# Patient Record
Sex: FEMALE | Race: WHITE | HISPANIC OR LATINO | Employment: FULL TIME | URBAN - METROPOLITAN AREA
[De-identification: names, ages, dates, MRNs, and addresses within clinical notes are randomized per-mention and may not be internally consistent; named-entity substitution may affect disease eponyms.]

---

## 2020-03-13 ENCOUNTER — HOSPITAL ENCOUNTER (EMERGENCY)
Facility: MEDICAL CENTER | Age: 35
End: 2020-03-13
Attending: EMERGENCY MEDICINE

## 2020-03-13 VITALS
OXYGEN SATURATION: 97 % | RESPIRATION RATE: 18 BRPM | TEMPERATURE: 98.1 F | HEART RATE: 89 BPM | SYSTOLIC BLOOD PRESSURE: 133 MMHG | DIASTOLIC BLOOD PRESSURE: 87 MMHG

## 2020-03-13 DIAGNOSIS — S51.812A LACERATION OF LEFT FOREARM, INITIAL ENCOUNTER: Primary | ICD-10-CM

## 2020-03-13 DIAGNOSIS — S66.922A FLEXOR TENDON LACERATION OF LEFT WRIST WITH OPEN WOUND, INITIAL ENCOUNTER: ICD-10-CM

## 2020-03-13 DIAGNOSIS — S61.502A FLEXOR TENDON LACERATION OF LEFT WRIST WITH OPEN WOUND, INITIAL ENCOUNTER: ICD-10-CM

## 2020-03-13 LAB
AMPHET UR QL SCN: NEGATIVE
BARBITURATES UR QL SCN: NEGATIVE
BENZODIAZ UR QL SCN: NEGATIVE
BZE UR QL SCN: NEGATIVE
CANNABINOIDS UR QL SCN: NEGATIVE
METHADONE UR QL SCN: NEGATIVE
OPIATES UR QL SCN: NEGATIVE
OXYCODONE UR QL SCN: NEGATIVE
PCP UR QL SCN: NEGATIVE
POC BREATHALIZER: 0 PERCENT (ref 0–0.01)
PROPOXYPH UR QL SCN: NEGATIVE

## 2020-03-13 PROCEDURE — 303485 HCHG DRESSING MEDIUM

## 2020-03-13 PROCEDURE — 304217 HCHG IRRIGATION SYSTEM

## 2020-03-13 PROCEDURE — 80307 DRUG TEST PRSMV CHEM ANLYZR: CPT

## 2020-03-13 PROCEDURE — 700102 HCHG RX REV CODE 250 W/ 637 OVERRIDE(OP): Performed by: EMERGENCY MEDICINE

## 2020-03-13 PROCEDURE — 700101 HCHG RX REV CODE 250: Performed by: EMERGENCY MEDICINE

## 2020-03-13 PROCEDURE — 304999 HCHG REPAIR-SIMPLE/INTERMED LEVEL 1

## 2020-03-13 PROCEDURE — 90791 PSYCH DIAGNOSTIC EVALUATION: CPT

## 2020-03-13 PROCEDURE — 303747 HCHG EXTRA SUTURE

## 2020-03-13 PROCEDURE — A9270 NON-COVERED ITEM OR SERVICE: HCPCS | Performed by: EMERGENCY MEDICINE

## 2020-03-13 PROCEDURE — 302970 POC BREATHALIZER: Performed by: EMERGENCY MEDICINE

## 2020-03-13 PROCEDURE — 99284 EMERGENCY DEPT VISIT MOD MDM: CPT

## 2020-03-13 RX ORDER — ACETAMINOPHEN 500 MG
1000 TABLET ORAL ONCE
Status: COMPLETED | OUTPATIENT
Start: 2020-03-13 | End: 2020-03-13

## 2020-03-13 RX ORDER — CEPHALEXIN 500 MG/1
500 CAPSULE ORAL ONCE
Status: COMPLETED | OUTPATIENT
Start: 2020-03-13 | End: 2020-03-13

## 2020-03-13 RX ORDER — LIDOCAINE HYDROCHLORIDE AND EPINEPHRINE 10; 10 MG/ML; UG/ML
10 INJECTION, SOLUTION INFILTRATION; PERINEURAL ONCE
Status: COMPLETED | OUTPATIENT
Start: 2020-03-13 | End: 2020-03-13

## 2020-03-13 RX ORDER — CEPHALEXIN 500 MG/1
500 CAPSULE ORAL 4 TIMES DAILY
Qty: 20 CAP | Refills: 0 | Status: SHIPPED | OUTPATIENT
Start: 2020-03-13 | End: 2020-03-18

## 2020-03-13 RX ADMIN — LIDOCAINE HYDROCHLORIDE,EPINEPHRINE BITARTRATE 10 ML: 10; .01 INJECTION, SOLUTION INFILTRATION; PERINEURAL at 01:00

## 2020-03-13 RX ADMIN — CEPHALEXIN 500 MG: 500 CAPSULE ORAL at 01:34

## 2020-03-13 RX ADMIN — ACETAMINOPHEN 1000 MG: 500 TABLET ORAL at 01:34

## 2020-03-13 SDOH — HEALTH STABILITY: MENTAL HEALTH: HOW OFTEN DO YOU HAVE 6 OR MORE DRINKS ON ONE OCCASION?: NEVER

## 2020-03-13 SDOH — HEALTH STABILITY: MENTAL HEALTH: HOW MANY STANDARD DRINKS CONTAINING ALCOHOL DO YOU HAVE ON A TYPICAL DAY?: 1 OR 2

## 2020-03-13 SDOH — HEALTH STABILITY: MENTAL HEALTH: HOW OFTEN DO YOU HAVE A DRINK CONTAINING ALCOHOL?: MONTHLY OR LESS

## 2020-03-13 ASSESSMENT — ENCOUNTER SYMPTOMS
NERVOUS/ANXIOUS: 0
WEAKNESS: 0
SENSORY CHANGE: 0

## 2020-03-13 ASSESSMENT — LIFESTYLE VARIABLES
DO YOU DRINK ALCOHOL: NO
DOES PATIENT WANT TO STOP DRINKING: NO

## 2020-03-13 NOTE — ED PROVIDER NOTES
ED Provider Note    Scribed for ALOK Isabel II* by Whit Starr. 3/13/2020  12:27 AM    Means of Arrival: Wheel chair  History obtained by: Patient  Limitations: None    CHIEF COMPLAINT  Laceration on left forearm.      HPI  Paty Aguero is a 34 y.o. female who presents to the Emergency Department for a self-inflicted laceration on her left forearm prior to arrival.  She says while at home with her boyfriend she heard him in another room talking to another female.  She says the conversation was apparently inappropriate.  There apparently was an argument and then as per the mom she decided to use a broken piece of glass to cut herself.  She says she had no intention of committing suicide.  She says she was trying to make a statement.  As a teenager she did something similar. . She states that her tetanus vaccines is up to date. She does not take any drugs or medication.  She denies feeling depressed lately.  She has had no previous thoughts of suicide.  She has not taken any medications as attempted overdose.      REVIEW OF SYSTEMS  Review of Systems   Skin:        Laceration to left forearm   Neurological: Negative for sensory change and weakness.   Psychiatric/Behavioral: Positive for suicidal ideas. The patient is not nervous/anxious.      See HPI for further details.    PAST MEDICAL HISTORY   None noted    SOCIAL HISTORY  Social History     Tobacco Use   • Smoking status: Never Smoker   Substance and Sexual Activity   • Alcohol use: Yes   • Drug use: No   • Sexual activity: None noted       SURGICAL HISTORY  patient denies any surgical history    CURRENT MEDICATIONS  Note noted    ALLERGIES  No Known Allergies    PHYSICAL EXAM  VITAL SIGNS: /87   Pulse 89   Temp 36.7 °C (98.1 °F)   Resp 18   SpO2 97%     Pulse ox interpretation: I interpret this pulse ox as normal.  Constitutional: Alert in no apparent distress.  HENT: No signs of trauma, Bilateral external ears normal, Nose normal.   Eyes:  Pupils are equal, Conjunctiva normal, Non-icteric.   Cardiovascular: Regular rate. See MSK exam  Thorax & Lungs: Normal respirations  Skin:  See msk exam  Back: No midline bony tenderness, No CVA tenderness.   Musculoskeletal:  7cm horizontal anterior forearm full thickness laceration. Visible tendon laceration with insertion at radial side of wrist and appears to originate from ulnar side. She is able to fully flex all digits and wrist with good strength and no limitations. Papable radial and ulnar pulses detactable. No sensory deficits.   Neurologic: No sensory deficits. No strength deficit. See msk exam.   Psychiatric: Affect normal, Judgment normal, Mood normal.     DIAGNOSTIC STUDIES / PROCEDURES  Laceration Repair Procedure Note    Indication: Laceration    Procedure: The patient was placed in the appropriate position and anesthesia around the laceration was obtained by infiltration using 1% Lidocaine with epinephrine. The area was then irrigated with normal saline. The laceration was closed in two layers. The subcutaneous layer was closed with 5-0 Vicryl using interrupted sutures. The skin was closed with 4-0 Ethilon using interrupted sutures. There were no additional lacerations requiring repair. The wound area was then dressed with a sterile dressing and a bandage.      Total repaired wound length: 6 cm.     Other Items: Suture count: 13 at skin and 5 subcutaneous    The patient tolerated the procedure well.    Complications: None    LABS  Pertinent Labs & Imaging studies reviewed. (See chart for details)    RADIOLOGY  No orders to display     Pertinent Labs & Imaging studies reviewed. (See chart for details)    COURSE & MEDICAL DECISION MAKING  Pertinent Labs & Imaging studies reviewed. (See chart for details)    12:27 AM This is a 34 y.o. female who presents with self-inflicted laceration to left forearm. Concerns for possible tendon injury. Because of self harm I believe she should meet with behavioral  health for further assessment.  Ordered for diagnostic alcohol and urine drug screen to evaluate. Patient will be treated with tylenol 650 mg for pain. Behavioral health will evaluate the patient.     12:37 AM - Prior to repair I spoke with Dr. Paniagua (Orthopedics). I shared an image of the forearm with him (with patient's permission). We suspect tendon either Flexor carpii radialis, or palmaris longus. She has not lost function of flexion of her wrist. Plan will be to close skin. She will need to follow up in clinic for re-evaluation and suture removal. Laceration repair procedure performed. See above note.  She was given a dose of Keflex because I am unsure cleanliness of object she cut herself with.  This was also a full-thickness wound with some structural damage (tendon laceration).    2:26 AM  [place] our behavioral health RN has met with Paty.  Please see his documentation.  At this time the 2 of us agree that she is unlikely a high risk of further harm to herself, specifically suicidal attempt.  The cutting that was self-inflicted this evening seemed to be attention-getting and was not intended to be as significant as it turned out to be (size and depth of wound).  She was provided with resources and has been able to pick the clinic that she can follow-up with.  We have discussed follow-up regarding reevaluation of extremity and wound in 7 to 10 days.    The patient will return for worsening symptoms and is stable at the time of discharge. The patient verbalizes understanding and will comply. Guidance provided on appropriate use of medications.    FINAL IMPRESSION  1. Laceration of left forearm, initial encounter    2. Self-inflicted injury    3. Flexor tendon laceration of left wrist with open wound, initial encounter         Whit MODI (Deangelo), am scribing for, and in the presence of, Victor Hugo Farley II M*.    Electronically signed by: Whit Starr (Deangelo), 3/13/2020    Victor Hugo MODI  ALOK LOYA* personally performed the services described in this documentation, as scribed by Whit Starr in my presence, and it is both accurate and complete.  C  The note accurately reflects work and decisions made by me.  Victor Hugo Farley II, M.D.  3/13/2020  2:28 AM

## 2020-03-13 NOTE — ED NOTES
The patient has been provided with discharge education and information.  The patient was also provided with instructions on follow up care and return precautions.  The patient verbalizes understanding of discharge instructions, follow up care, and return precautions.  All questions have been answered. 1 RX written by JAYLENE.  NAD, A/Gianna, good color and appropriate at time of discharge.  Patient ambulated out of department.

## 2020-03-13 NOTE — CONSULTS
"[facility]   TRIAGE ASSESSMENT    Name: Paty Aguero  MRN: [id number]  : 1985  Age: 34 y.o.  Date of assessment: 3/13/2020  PCP: Pcp Pt States None  Persons in attendance: Patient    CHIEF COMPLAINT/PRESENTING ISSUE (as stated by Paty Aguero): The patient presents as a 34 year-old female, arriving to the ED for a self-inflicted laceration on her left forearm prior to arrival; the patient related to this writer that she was with her boyfriend at home earlier in the evening when she heard him talking to another female while on the phone. She adds that she became upset at the time and during the argument used a broken piece of glass to cut her arm; she adamantly denies that this was a suicide attempt, relating that her intent was not to kill herself but to teixeira attention and self-harm to a lesser extent. She relates that she was surprised at the depth of the wound, at which point she became worried and came to the ER. Patient calm and cooperative, future/forward thinking, speaking clearly and coherently with this writer, relating regret (The pt states to this writer \"Well, I feel stupid thinking about it now. I was pissed but I didn't mean to cut myself deep, I could have handled that so much better and now I just feel dumb\"). The patient denies any substance abuse and her UDS came back negative as she reported it would. The patient denies SI/HI, as well as AH/VH; no delusions or altered mental state noted/observed. The patient is agreeable to following up with an outpatient counselor and will follow-up in the morning. At this time the patient presents with no safety concerns and will be safe to discharge home.   Chief Complaint   Patient presents with   • Laceration     self inflicted wound         CURRENT LIVING SITUATION/SOCIAL SUPPORT: The patient lives at home; she works full-time as a , which she notes she enjoys. She reports she has three kids; at this time the kids are staying " with their father as part of a joint custody agreement. She reports she is originally from [place] but has lived in the [place] since she she was a teenager. The patient otherwise reports that she has a strong support network consisting of friends and family.     BEHAVIORAL HEALTH TREATMENT HISTORY  Does patient/parent report a history of prior behavioral health treatment for patient?   No:    SAFETY ASSESSMENT - SELF  Does patient acknowledge current or past symptoms of dangerousness to self? yes  Does parent/significant other report patient has current or past symptoms of dangerousness to self? N\A  Does presenting problem suggest symptoms of dangerousness to self? No    SAFETY ASSESSMENT - OTHERS  Does patient acknowledge current or past symptoms of aggressive behavior or risk to others? no  Does parent/significant other report patient has current or past symptoms of aggressive behavior or risk to others?  N\A  Does presenting problem suggest symptoms of dangerousness to others? No    Crisis Safety Plan completed and copy given to patient? Yes, Crisis Safety Plan placed in chart; copy given to patient at time of discharge.     ABUSE/NEGLECT SCREENING  Does patient report feeling “unsafe” in his/her home, or afraid of anyone?  no  Does patient report any history of physical, sexual, or emotional abuse?  no  Does parent or significant other report any of the above? N\A  Is there evidence of neglect by self?  no  Is there evidence of neglect by a caregiver? no  Does the patient/parent report any history of CPS/APS/police involvement related to suspected abuse/neglect or domestic violence? no  Based on the information provided during the current assessment, is a mandated report of suspected abuse/neglect being made?  No    SUBSTANCE USE SCREENING  Yes:  Balta all substances used in the past 30 days:      Last Use Amount   []   Alcohol     []   Marijuana     []   Heroin     []   Prescription Opioids  (used without  "prescription, for    recreation, or in excess of prescribed amount)     []   Other Prescription  (used without prescription, for    recreation, or in excess of prescribed amount)     []   Cocaine      []   Methamphetamine     []   \"\" drugs (ectasy, MDMA)     []   Other substances        UDS results: Negative  Breathalyzer results: 0.00    What consequences does the patient associate with any of the above substance use and or addictive behaviors? None    Risk factors for detox (check all that apply):  []  Seizures   []  Diaphoretic (sweating)   []  Tremors   []  Hallucinations   []  Increased blood pressure   []  Decreased blood pressure   []  Other   []  None      [] Patient education on risk factors for detoxification and instructed to return to ER as needed.      MENTAL STATUS   Participation: Active verbal participation, Attentive, Engaged and Open to feedback  Grooming: Good  Orientation: Alert and Fully Oriented  Behavior: Calm  Eye contact: Good  Mood: Euthymic  Affect: Congruent with content  Thought process: Logical and Goal-directed  Thought content: Within normal limits  Speech: Rate within normal limits and Volume within normal limits  Perception: Within normal limits  Memory:  No gross evidence of memory deficits  Insight: Adequate  Judgment:  Adequate  Other:    Collateral information:   Source:  [] Significant other present in person:   [] Significant other by telephone  [] [facility]   [x] [facility] Nursing Staff  [x] [facility]  [] Other:     CLINICAL IMPRESSIONS:  Primary: R/O for Adjustment DO  Secondary:         IDENTIFIED NEEDS/PLAN:  [Trigger DISPOSITION list for any items marked]    []  Imminent safety risk - self [] Imminent safety risk - others   []  Acute substance withdrawal []  Psychosis/Impaired reality testing   [x]  Mood/anxiety []  Substance use/Addictive behavior   [x]  Maladaptive behaviro []  Parent/child conflict   [x]  Family/Couples conflict []  " Biomedical   []  Housing []  Financial   []   Legal  Occupational/Educational   []  Domestic violence []  Other:     Disposition: Consulted with Dr. Farley; at this time the patient presents with no safety concerns and will be safe to discharge home. Patient was provided with psychiatry/counseling resources and she notes she will follow-up with scheduling an appointment upon discharge in the AM. At this time the patient will be safe to return home pending any new medical or mental health concerns.     Does patient express agreement with the above plan? yes    Referral appointment(s) scheduled? no    Alert team only:   I have discussed findings and recommendations with Dr. Farley who is in agreement with these recommendations.     CHAITANYA Rothman  3/13/2020

## 2020-03-13 NOTE — ED TRIAGE NOTES
Chief Complaint   Patient presents with   • Laceration     self inflicted wound      Patient reports that she got very angry tonight because her boyfriend has been talking to another woman. She stated she got very angry and broke a glass and used a piece of the glass to cut her left forearm.   Home tourniquet applied.      Patient taken immediately to [address] [facility] and ERP notified.

## 2020-08-17 DIAGNOSIS — Z01.812 PRE-OPERATIVE LABORATORY EXAMINATION: ICD-10-CM

## 2020-08-17 LAB
COVID ORDER STATUS COVID19: NORMAL
SARS-COV-2 RNA RESP QL NAA+PROBE: NOTDETECTED
SPECIMEN SOURCE: NORMAL

## 2020-08-17 PROCEDURE — U0003 INFECTIOUS AGENT DETECTION BY NUCLEIC ACID (DNA OR RNA); SEVERE ACUTE RESPIRATORY SYNDROME CORONAVIRUS 2 (SARS-COV-2) (CORONAVIRUS DISEASE [COVID-19]), AMPLIFIED PROBE TECHNIQUE, MAKING USE OF HIGH THROUGHPUT TECHNOLOGIES AS DESCRIBED BY CMS-2020-01-R: HCPCS

## 2020-08-17 PROCEDURE — C9803 HOPD COVID-19 SPEC COLLECT: HCPCS

## 2020-08-17 RX ORDER — PROMETHAZINE HYDROCHLORIDE 12.5 MG/1
TABLET ORAL
COMMUNITY
Start: 2020-08-12

## 2020-08-17 RX ORDER — CHOLECALCIFEROL (VITAMIN D3) 125 MCG
500 CAPSULE ORAL DAILY
COMMUNITY

## 2020-08-17 RX ORDER — MULTIVIT WITH MINERALS/LUTEIN
TABLET ORAL
COMMUNITY

## 2020-08-17 RX ORDER — OXYCODONE AND ACETAMINOPHEN 10; 325 MG/1; MG/1
TABLET ORAL
COMMUNITY
Start: 2020-08-12

## 2020-08-17 RX ORDER — CEPHALEXIN 500 MG/1
CAPSULE ORAL
COMMUNITY
Start: 2020-08-12

## 2020-08-17 NOTE — OR NURSING
"Preadmit appointment: \" Preparing for your Procedure information\" sheet given to patient with verbal and written instructions. Patient instructed to continue prescribed medications through the day before surgery, instructed to take the following medications the day of surgery per anesthesia protocol:  NONE  Pt states, \"no issues with anesthesia\".  Anesthesia Fasting guidelines handout given to Pt, NPO at midnight prior to surgery and clear liquids up to 2 hours prior to surgery, clear liquids defined for Pt    All Pt's questions and concerns answered or addressed.  COVID test 8/17  "

## 2020-08-20 ENCOUNTER — HOSPITAL ENCOUNTER (OUTPATIENT)
Facility: MEDICAL CENTER | Age: 35
End: 2020-08-20
Attending: SPECIALIST | Admitting: SPECIALIST
Payer: COMMERCIAL

## 2020-08-20 ENCOUNTER — ANESTHESIA (OUTPATIENT)
Dept: SURGERY | Facility: MEDICAL CENTER | Age: 35
End: 2020-08-20
Payer: COMMERCIAL

## 2020-08-20 ENCOUNTER — ANESTHESIA EVENT (OUTPATIENT)
Dept: SURGERY | Facility: MEDICAL CENTER | Age: 35
End: 2020-08-20
Payer: COMMERCIAL

## 2020-08-20 VITALS
HEART RATE: 83 BPM | DIASTOLIC BLOOD PRESSURE: 60 MMHG | SYSTOLIC BLOOD PRESSURE: 139 MMHG | TEMPERATURE: 98.6 F | OXYGEN SATURATION: 97 % | RESPIRATION RATE: 18 BRPM | BODY MASS INDEX: 22.26 KG/M2 | HEIGHT: 65 IN | WEIGHT: 133.6 LBS

## 2020-08-20 PROBLEM — Z41.1 ENCOUNTER FOR COSMETIC SURGERY: Status: ACTIVE | Noted: 2020-08-20

## 2020-08-20 LAB — HCG UR QL: NEGATIVE

## 2020-08-20 PROCEDURE — 160046 HCHG PACU - 1ST 60 MINS PHASE II: Performed by: SPECIALIST

## 2020-08-20 PROCEDURE — 81025 URINE PREGNANCY TEST: CPT

## 2020-08-20 PROCEDURE — 501838 HCHG SUTURE GENERAL: Performed by: SPECIALIST

## 2020-08-20 PROCEDURE — 700102 HCHG RX REV CODE 250 W/ 637 OVERRIDE(OP)

## 2020-08-20 PROCEDURE — 700101 HCHG RX REV CODE 250: Performed by: SPECIALIST

## 2020-08-20 PROCEDURE — 160028 HCHG SURGERY MINUTES - 1ST 30 MINS LEVEL 3: Performed by: SPECIALIST

## 2020-08-20 PROCEDURE — 160025 RECOVERY II MINUTES (STATS): Performed by: SPECIALIST

## 2020-08-20 PROCEDURE — 160002 HCHG RECOVERY MINUTES (STAT): Performed by: SPECIALIST

## 2020-08-20 PROCEDURE — 700102 HCHG RX REV CODE 250 W/ 637 OVERRIDE(OP): Performed by: ANESTHESIOLOGY

## 2020-08-20 PROCEDURE — 160035 HCHG PACU - 1ST 60 MINS PHASE I: Performed by: SPECIALIST

## 2020-08-20 PROCEDURE — A9270 NON-COVERED ITEM OR SERVICE: HCPCS

## 2020-08-20 PROCEDURE — 700101 HCHG RX REV CODE 250: Performed by: ANESTHESIOLOGY

## 2020-08-20 PROCEDURE — 700105 HCHG RX REV CODE 258: Performed by: SPECIALIST

## 2020-08-20 PROCEDURE — 700111 HCHG RX REV CODE 636 W/ 250 OVERRIDE (IP): Performed by: SPECIALIST

## 2020-08-20 PROCEDURE — 700111 HCHG RX REV CODE 636 W/ 250 OVERRIDE (IP): Performed by: ANESTHESIOLOGY

## 2020-08-20 PROCEDURE — 500817 HCHG MAMMARY SIZER: Performed by: SPECIALIST

## 2020-08-20 PROCEDURE — 502575 HCHG PACK, BREAST: Performed by: SPECIALIST

## 2020-08-20 PROCEDURE — 160039 HCHG SURGERY MINUTES - EA ADDL 1 MIN LEVEL 3: Performed by: SPECIALIST

## 2020-08-20 PROCEDURE — A9270 NON-COVERED ITEM OR SERVICE: HCPCS | Performed by: ANESTHESIOLOGY

## 2020-08-20 PROCEDURE — 160048 HCHG OR STATISTICAL LEVEL 1-5: Performed by: SPECIALIST

## 2020-08-20 PROCEDURE — 160009 HCHG ANES TIME/MIN: Performed by: SPECIALIST

## 2020-08-20 DEVICE — IMPLANTABLE DEVICE: Type: IMPLANTABLE DEVICE | Site: BREAST | Status: FUNCTIONAL

## 2020-08-20 RX ORDER — OXYCODONE HYDROCHLORIDE AND ACETAMINOPHEN 5; 325 MG/1; MG/1
1 TABLET ORAL
Status: COMPLETED | OUTPATIENT
Start: 2020-08-20 | End: 2020-08-20

## 2020-08-20 RX ORDER — SODIUM CHLORIDE, SODIUM LACTATE, POTASSIUM CHLORIDE, CALCIUM CHLORIDE 600; 310; 30; 20 MG/100ML; MG/100ML; MG/100ML; MG/100ML
INJECTION, SOLUTION INTRAVENOUS CONTINUOUS
Status: DISCONTINUED | OUTPATIENT
Start: 2020-08-20 | End: 2020-08-20 | Stop reason: HOSPADM

## 2020-08-20 RX ORDER — HYDROMORPHONE HYDROCHLORIDE 1 MG/ML
0.2 INJECTION, SOLUTION INTRAMUSCULAR; INTRAVENOUS; SUBCUTANEOUS
Status: DISCONTINUED | OUTPATIENT
Start: 2020-08-20 | End: 2020-08-20 | Stop reason: HOSPADM

## 2020-08-20 RX ORDER — HYDROMORPHONE HYDROCHLORIDE 1 MG/ML
0.4 INJECTION, SOLUTION INTRAMUSCULAR; INTRAVENOUS; SUBCUTANEOUS
Status: DISCONTINUED | OUTPATIENT
Start: 2020-08-20 | End: 2020-08-20 | Stop reason: HOSPADM

## 2020-08-20 RX ORDER — DIPHENHYDRAMINE HYDROCHLORIDE 50 MG/ML
12.5 INJECTION INTRAMUSCULAR; INTRAVENOUS
Status: DISCONTINUED | OUTPATIENT
Start: 2020-08-20 | End: 2020-08-20 | Stop reason: HOSPADM

## 2020-08-20 RX ORDER — METOPROLOL TARTRATE 1 MG/ML
1 INJECTION, SOLUTION INTRAVENOUS
Status: DISCONTINUED | OUTPATIENT
Start: 2020-08-20 | End: 2020-08-20 | Stop reason: HOSPADM

## 2020-08-20 RX ORDER — HALOPERIDOL 5 MG/ML
1 INJECTION INTRAMUSCULAR
Status: DISCONTINUED | OUTPATIENT
Start: 2020-08-20 | End: 2020-08-20 | Stop reason: HOSPADM

## 2020-08-20 RX ORDER — OXYCODONE HYDROCHLORIDE AND ACETAMINOPHEN 5; 325 MG/1; MG/1
2 TABLET ORAL
Status: COMPLETED | OUTPATIENT
Start: 2020-08-20 | End: 2020-08-20

## 2020-08-20 RX ORDER — MIDAZOLAM HYDROCHLORIDE 1 MG/ML
1 INJECTION INTRAMUSCULAR; INTRAVENOUS
Status: DISCONTINUED | OUTPATIENT
Start: 2020-08-20 | End: 2020-08-20 | Stop reason: HOSPADM

## 2020-08-20 RX ORDER — CEFAZOLIN SODIUM 1 G/3ML
INJECTION, POWDER, FOR SOLUTION INTRAMUSCULAR; INTRAVENOUS
Status: DISCONTINUED | OUTPATIENT
Start: 2020-08-20 | End: 2020-08-20 | Stop reason: HOSPADM

## 2020-08-20 RX ORDER — HYDRALAZINE HYDROCHLORIDE 20 MG/ML
5 INJECTION INTRAMUSCULAR; INTRAVENOUS
Status: DISCONTINUED | OUTPATIENT
Start: 2020-08-20 | End: 2020-08-20 | Stop reason: HOSPADM

## 2020-08-20 RX ORDER — BUPIVACAINE HYDROCHLORIDE AND EPINEPHRINE 2.5; 5 MG/ML; UG/ML
INJECTION, SOLUTION EPIDURAL; INFILTRATION; INTRACAUDAL; PERINEURAL
Status: DISCONTINUED | OUTPATIENT
Start: 2020-08-20 | End: 2020-08-20 | Stop reason: HOSPADM

## 2020-08-20 RX ORDER — LIDOCAINE HYDROCHLORIDE 20 MG/ML
INJECTION, SOLUTION EPIDURAL; INFILTRATION; INTRACAUDAL; PERINEURAL PRN
Status: DISCONTINUED | OUTPATIENT
Start: 2020-08-20 | End: 2020-08-20 | Stop reason: SURG

## 2020-08-20 RX ORDER — CEFAZOLIN SODIUM 1 G/3ML
INJECTION, POWDER, FOR SOLUTION INTRAMUSCULAR; INTRAVENOUS PRN
Status: DISCONTINUED | OUTPATIENT
Start: 2020-08-20 | End: 2020-08-20 | Stop reason: SURG

## 2020-08-20 RX ORDER — GENTAMICIN SULFATE 40 MG/ML
INJECTION, SOLUTION INTRAMUSCULAR; INTRAVENOUS
Status: DISCONTINUED | OUTPATIENT
Start: 2020-08-20 | End: 2020-08-20 | Stop reason: HOSPADM

## 2020-08-20 RX ORDER — ONDANSETRON 2 MG/ML
4 INJECTION INTRAMUSCULAR; INTRAVENOUS
Status: DISCONTINUED | OUTPATIENT
Start: 2020-08-20 | End: 2020-08-20 | Stop reason: HOSPADM

## 2020-08-20 RX ORDER — ONDANSETRON 2 MG/ML
INJECTION INTRAMUSCULAR; INTRAVENOUS PRN
Status: DISCONTINUED | OUTPATIENT
Start: 2020-08-20 | End: 2020-08-20 | Stop reason: SURG

## 2020-08-20 RX ORDER — HYDROMORPHONE HYDROCHLORIDE 1 MG/ML
0.1 INJECTION, SOLUTION INTRAMUSCULAR; INTRAVENOUS; SUBCUTANEOUS
Status: DISCONTINUED | OUTPATIENT
Start: 2020-08-20 | End: 2020-08-20 | Stop reason: HOSPADM

## 2020-08-20 RX ORDER — MEPERIDINE HYDROCHLORIDE 25 MG/ML
12.5 INJECTION INTRAMUSCULAR; INTRAVENOUS; SUBCUTANEOUS
Status: DISCONTINUED | OUTPATIENT
Start: 2020-08-20 | End: 2020-08-20 | Stop reason: HOSPADM

## 2020-08-20 RX ORDER — LABETALOL HYDROCHLORIDE 5 MG/ML
5 INJECTION, SOLUTION INTRAVENOUS
Status: DISCONTINUED | OUTPATIENT
Start: 2020-08-20 | End: 2020-08-20 | Stop reason: HOSPADM

## 2020-08-20 RX ORDER — DEXAMETHASONE SODIUM PHOSPHATE 4 MG/ML
INJECTION, SOLUTION INTRA-ARTICULAR; INTRALESIONAL; INTRAMUSCULAR; INTRAVENOUS; SOFT TISSUE PRN
Status: DISCONTINUED | OUTPATIENT
Start: 2020-08-20 | End: 2020-08-20 | Stop reason: SURG

## 2020-08-20 RX ADMIN — LIDOCAINE HYDROCHLORIDE 50 MG: 20 INJECTION, SOLUTION EPIDURAL; INFILTRATION; INTRACAUDAL; PERINEURAL at 08:13

## 2020-08-20 RX ADMIN — POVIDONE-IODINE 15 ML: 10 SOLUTION TOPICAL at 07:41

## 2020-08-20 RX ADMIN — EPHEDRINE SULFATE 25 MG: 50 INJECTION INTRAMUSCULAR; INTRAVENOUS; SUBCUTANEOUS at 08:50

## 2020-08-20 RX ADMIN — MIDAZOLAM HYDROCHLORIDE 2 MG: 1 INJECTION, SOLUTION INTRAMUSCULAR; INTRAVENOUS at 08:13

## 2020-08-20 RX ADMIN — FENTANYL CITRATE 50 MCG: 50 INJECTION, SOLUTION INTRAMUSCULAR; INTRAVENOUS at 11:08

## 2020-08-20 RX ADMIN — LIDOCAINE HYDROCHLORIDE 0.5 ML: 10 INJECTION, SOLUTION INFILTRATION; PERINEURAL at 07:41

## 2020-08-20 RX ADMIN — DEXAMETHASONE SODIUM PHOSPHATE 4 MG: 4 INJECTION, SOLUTION INTRAMUSCULAR; INTRAVENOUS at 08:13

## 2020-08-20 RX ADMIN — MIDAZOLAM HYDROCHLORIDE 1 MG: 1 INJECTION, SOLUTION INTRAMUSCULAR; INTRAVENOUS at 11:08

## 2020-08-20 RX ADMIN — CEFAZOLIN 2 G: 1 INJECTION, POWDER, FOR SOLUTION INTRAVENOUS at 08:13

## 2020-08-20 RX ADMIN — FENTANYL CITRATE 250 MCG: 50 INJECTION, SOLUTION INTRAMUSCULAR; INTRAVENOUS at 08:13

## 2020-08-20 RX ADMIN — SODIUM CHLORIDE, POTASSIUM CHLORIDE, SODIUM LACTATE AND CALCIUM CHLORIDE 1000 ML: 600; 310; 30; 20 INJECTION, SOLUTION INTRAVENOUS at 07:41

## 2020-08-20 RX ADMIN — PROPOFOL 200 MG: 10 INJECTION, EMULSION INTRAVENOUS at 08:13

## 2020-08-20 RX ADMIN — ONDANSETRON 4 MG: 2 INJECTION INTRAMUSCULAR; INTRAVENOUS at 08:13

## 2020-08-20 RX ADMIN — OXYCODONE HYDROCHLORIDE AND ACETAMINOPHEN 2 TABLET: 5; 325 TABLET ORAL at 11:08

## 2020-08-20 ASSESSMENT — PAIN SCALES - GENERAL: PAIN_LEVEL: 3

## 2020-08-20 NOTE — OR SURGEON
Immediate Post OP Note    PreOp Diagnosis: asymmetry, ptosis    PostOp Diagnosis: same    Procedure(s):  AUGMENTATION, BREAST - Wound Class: Clean  MASTOPEXY - BENELLI - Wound Class: Clean    Surgeon(s):  Amaodr Ruvalcaba M.D.    Anesthesiologist/Type of Anesthesia:  Anesthesiologist: Teo Smith M.D./General    Surgical Staff:  Circulator: Iveth Ji R.N.  Scrub Person: Whit Villanueva    Specimens removed if any:  * No specimens in log *    Estimated Blood Loss: 75 cc    Findings:     Complications: none        8/20/2020 11:04 AM Amador Ruvalcaba M.D.

## 2020-08-20 NOTE — OR NURSING
1056: To PACU from OR via gurney, Patient is on 6 lpm of supplemental oxygen via mask, sleeping, respirations spontaneous and non-labored via OPA. Patient has dressing on chest post breast augmentation bilaterally, no drains and dressings are clean, dry, and intact. Patient appears to be comfortable at this time. (No grimace)    1102: OPA DC'd    1110: Patient having 8/10 p[ain and is physically impulsive, thrashing about on the gurney and rolling, Patient verbally reassured and reoriented. Plan to medicate per MAR.    1120: Patient voided in bedpan.    1125: Pain at 2/10 and tolerable, Meets criteria to transfer to Stage 2, No change in surgical site assessment.    1130: Patient resting, Boyfriend Carlos updated by telephone. Patient states that she is still 2/10 and tolerable for pain rating.    1140: Meets criteria to transfer to Stage 2

## 2020-08-20 NOTE — ANESTHESIA POSTPROCEDURE EVALUATION
Patient: Paty Parrish    Procedure Summary     Date: 08/20/20 Room / Location:  OR  / SURGERY [facility]    Anesthesia Start: 0813 Anesthesia Stop:     Procedures:       AUGMENTATION, BREAST (Bilateral Breast)      MASTOPEXY - BENELLI (Bilateral Breast) Diagnosis: (COSMETIC)    Surgeon: Amador Ruvalcaba M.D. Responsible Provider: Teo Smith M.D.    Anesthesia Type: general ASA Status: 1          Final Anesthesia Type: general  Last vitals  BP   Blood Pressure: 125/69    Temp   36.7 °C (98.1 °F)    Pulse   Pulse: 67   Resp   16    SpO2   100 %      Anesthesia Post Evaluation    Patient location during evaluation: PACU  Patient participation: complete - patient participated  Level of consciousness: awake and alert  Pain score: 3    Airway patency: patent  Anesthetic complications: no  Cardiovascular status: hemodynamically stable  Respiratory status: acceptable  Hydration status: euvolemic    PONV: none           Nurse Pain Score: 0 (NPRS)

## 2020-08-20 NOTE — OP REPORT
DATE OF SERVICE:  08/20/2020    PREOPERATIVE DIAGNOSES:  1.  Bilateral ptosis.  2.  Breast asymmetry.    POSTOPERATIVE DIAGNOSES:  1.  Bilateral ptosis.  2.  Breast asymmetry.    OPERATIVE PROCEDURE:  Bilateral breast augmentation with Benelli mastopexy   (Allergan SRF gel implants, right side 520 mL, left side 450 mL).    SURGEON:  Amador Ruvalcaba MD    ANESTHESIOLOGIST:  Teo Smith MD    ANESTHESIA:  General endotracheal tube.    COMPLICATIONS:  None.    ESTIMATED BLOOD LOSS:  Less than 75 mL.    INDICATIONS:  Patient is a 35-year-old female who desires bilateral breast   augmentation.  She has breast asymmetry with the left breast more full and   more ptotic than the right.  She is aware of these asymmetries preoperatively.    She is also aware of the risks such as infection, bleeding, encapsulation,   deflation, asymmetries, nerve damage, rippling on the sides, possible   decreased cancer detection.  Patient is aware of the risks and wished to   proceed.    FINDINGS:  As above.    PROCEDURE:  Patient was preoperatively marked in the holding room in the   standing position.  She was taken to the operating theater where general   anesthesia was induced.  Two grams of Ancef were given prior to starting the   procedure.  Starting on the right side, I used a 38 mm template to meño the   nipple-areolar complex.  I then injected 10 mL of 0.25% Marcaine with   epinephrine.  I deepithelialized the Benelli mastopexy and then tacked the   Benelli mastopexy down to the areola with 3-0 Vicryl sutures in the deep   dermal layer in a clockwise fashion.  Once that was completed, a 3-0 FiberWire   suture was used around the nipple-areolar complex in a pursestring fashion   that was then pulled tight, bringing the edge of the Benelli mastopexy down to   the edge of the areola.  The final stitch was a 3-0 Stratafix suture in a   subcuticular fashion all the way around the areola to close the skin.  Once I   completed the  right side, I went over to the left side and did a similar   dissection, and Benelli mastopexy repair.  I took out more skin on the left   side to try to improve the symmetry between the 2 sides.  Once both Benelli   mastopexies were completed, I went over to the right side, I injected 10 mL of   0.25% Marcaine with epinephrine.  I then made an inframammary incision,   dissected down through the breast tissue with electrocautery until I   identified the lateral edge of the pectoralis major muscle.  A submuscular   pocket was developed.  Hemostasis was obtained with electrocautery.  The   pocket was irrigated with normal saline solution with double antibiotic.  I   also irrigated the pocket with Betadine solution.  I injected 20 mL of 0.25%   Marcaine with epinephrine into the pectoralis major muscle for postoperative   pain control.  I placed a saline sizer in the right breast pocket and filled   it to 540 mL.  I went over to the left side and completed a similar dissection   developing a submuscular pocket.  On this side, I also placed a saline sizer   and filled it to 540 mL.  Quite obvious that the left side had more breast   volume as we expected preoperatively.  I reduced the volume in both implants   to 520 mL and then I continued to reduce the left side until we felt that the   symmetry was very similar, which was approximately 450 mL.  The patient was   sat up and pocket adjustments were made.  Once I was satisfied with the   symmetry between the 2 sides, the patient was laid back down.  The saline   sizers were removed.  The pocket was irrigated again and then we placed new   Allergan SRF gel implants.  On the right side, I placed a 520 mL implant   through a Mcneal funnel into the submuscular position.  Over on the left side,   I placed a 450 mL size implant again through the Mcneal funnel into the   submuscular position.  Patient was sat up one more time just to check for   symmetry.  Once we felt that  was adequate, the patient was laid back down.    The pocket was then closed with 3-0 Vicryl sutures in a multilayered fashion.    The skin itself was closed with a 3-0 Stratafix suture in a subcuticular   fashion double layer closure.  Steri-Strips were applied to all the incisions   and the patient had 2-inch foam tape placed around her breasts.  I then placed   her in a comfort supportive bra and finally I wrapped her with a SureBand Ace   wrap for compression.  She was extubated in the operating room and returned   to the PACU in stable condition.       ____________________________________     MD JOSE ANTONIO GONZALEZ / NERI    DD:  08/20/2020 15:57:25  DT:  08/20/2020 16:51:45    D#:  [id number]  Job#:  [id number]

## 2020-08-20 NOTE — OR NURSING
1142 Pt arrived from PACU s/p bilateral breast augmentation, report received from BASSEM Schwartz. Pt breathing unlabored; Patient states pain 6/10, but tolerable, denies nausea at this time; surgical incisions wrapped in surgical dressing and ace bandage; clean, dry and intact. Pt changing into home clothes @ bedside with assistance x1.     1225 Discharge instructions and medications gone over with pt and ride. All questions answered. Pt meets DC criteria. PIV DC'd. RX at home. Pt transported to vehicle via  in stable condition.

## 2020-08-20 NOTE — ANESTHESIA TIME REPORT
Anesthesia Start and Stop Event Times     Date Time Event    8/20/2020 0732 Ready for Procedure     0813 Anesthesia Start     1059 Anesthesia Stop        Responsible Staff  08/20/20    Name Role Begin End    Teo Smith M.D. Anesth 0813 1059        Preop Diagnosis (Free Text):  Pre-op Diagnosis     COSMETIC        Preop Diagnosis (Codes):    Post op Diagnosis  Encounter for cosmetic surgery      Premium Reason  Non-Premium    Comments:

## 2020-08-20 NOTE — DISCHARGE INSTRUCTIONS
ACTIVITY: Rest and take it easy for the first 24 hours.  A responsible adult is recommended to remain with you during that time.  It is normal to feel sleepy.  We encourage you to not do anything that requires balance, judgment or coordination.    MILD FLU-LIKE SYMPTOMS ARE NORMAL. YOU MAY EXPERIENCE GENERALIZED MUSCLE ACHES, THROAT IRRITATION, HEADACHE AND/OR SOME NAUSEA.    FOR 24 HOURS DO NOT:  Drive, operate machinery or run household appliances.  Drink beer or alcoholic beverages.   Make important decisions or sign legal documents.    SPECIAL INSTRUCTIONS: Head of bed at 30 degrees or better, Keep dressing clean, dry and intact until instructed otherwise by surgeon    DIET: To avoid nausea, slowly advance diet as tolerated, avoiding spicy or greasy foods for the first day.  Add more substantial food to your diet according to your physician's instructions.  Babies can be fed formula or breast milk as soon as they are hungry.  INCREASE FLUIDS AND FIBER TO AVOID CONSTIPATION.    SURGICAL DRESSING/BATHING: after follow up visit with carin    FOLLOW-UP APPOINTMENT:  A follow-up appointment should be arranged with your doctor,  call to schedule.    You should CALL YOUR PHYSICIAN if you develop:  Fever greater than 101 degrees F.  Pain not relieved by medication, or persistent nausea or vomiting.  Excessive bleeding (blood soaking through dressing) or unexpected drainage from the wound.  Extreme redness or swelling around the incision site, drainage of pus or foul smelling drainage.  Inability to urinate or empty your bladder within 8 hours.  Problems with breathing or chest pain.    You should call 911 if you develop problems with breathing or chest pain.  If you are unable to contact your doctor or surgical center, you should go to the nearest emergency room or urgent care center.  Physician's telephone #: [phone]    If any questions arise, call your doctor.  If your doctor is not available, please feel  free to call the Surgical Center at [phone].  The Center is open Monday through Friday from 7AM to 7PM.  You can also call the HEALTH HOTLINE open 24 hours/day, 7 days/week and speak to a nurse at [phone], or toll free at [phone].    A registered nurse may call you a few days after your surgery to see how you are doing after your procedure.    MEDICATIONS: Resume taking daily medication.  Take prescribed pain medication with food.  If no medication is prescribed, you may take non-aspirin pain medication if needed.  PAIN MEDICATION CAN BE VERY CONSTIPATING.  Take a stool softener or laxative such as senokot, pericolace, or milk of magnesia if needed.    Prescription given for N/A.  Last pain medication given at  11:10 am.    If your physician has prescribed pain medication that includes Acetaminophen (Tylenol), do not take additional Acetaminophen (Tylenol) while taking the prescribed medication.    Depression / Suicide Risk    As you are discharged from this [facility] facility, it is important to learn how to keep safe from harming yourself.    Recognize the warning signs:  · Abrupt changes in personality, positive or negative- including increase in energy   · Giving away possessions  · Change in eating patterns- significant weight changes-  positive or negative  · Change in sleeping patterns- unable to sleep or sleeping all the time   · Unwillingness or inability to communicate  · Depression  · Unusual sadness, discouragement and loneliness  · Talk of wanting to die  · Neglect of personal appearance   · Rebelliousness- reckless behavior  · Withdrawal from people/activities they love  · Confusion- inability to concentrate     If you or a loved one observes any of these behaviors or has concerns about self-harm, here's what you can do:  · Talk about it- your feelings and reasons for harming yourself  · Remove any means that you might use to hurt yourself (examples: pills, rope, extension  cords, firearm)  · Get professional help from the community (Mental Health, Substance Abuse, psychological counseling)  · Do not be alone:Call your Safe Contact- someone whom you trust who will be there for you.  · Call your local CRISIS HOTLINE 017-1165 or 355-987-7614  · Call your local Children's Mobile Crisis Response Team [place] (704) 843-2663 or www.Abimate.ee  · Call the toll free National Suicide Prevention Hotlines   · National Suicide Prevention Lifeline 964-440-KMZK (1959)  · National Hope Line Network 800-SUICIDE (496-6806)

## 2020-08-20 NOTE — ANESTHESIA PROCEDURE NOTES
Airway    Date/Time: 8/20/2020 8:13 AM  Performed by: Teo Smith M.D.  Authorized by: Teo Smith M.D.     Location:  OR  Urgency:  Elective  Indications for Airway Management:  Anesthesia      Spontaneous Ventilation: absent    Sedation Level:  Deep  Preoxygenated: Yes    Final Airway Type:  Supraglottic airway  Final Supraglottic Airway:  Standard LMA    SGA Size:  3  Number of Attempts at Approach:  1

## 2025-06-02 ENCOUNTER — APPOINTMENT (OUTPATIENT)
Dept: OBGYN | Facility: CLINIC | Age: 40
End: 2025-06-02

## 2025-06-02 ENCOUNTER — HOSPITAL ENCOUNTER (OUTPATIENT)
Facility: MEDICAL CENTER | Age: 40
End: 2025-06-02
Attending: NURSE PRACTITIONER
Payer: COMMERCIAL

## 2025-06-02 VITALS — SYSTOLIC BLOOD PRESSURE: 110 MMHG | WEIGHT: 157.4 LBS | DIASTOLIC BLOOD PRESSURE: 60 MMHG | BODY MASS INDEX: 26.6 KG/M2

## 2025-06-02 DIAGNOSIS — O09.521 MULTIGRAVIDA OF ADVANCED MATERNAL AGE IN FIRST TRIMESTER: ICD-10-CM

## 2025-06-02 DIAGNOSIS — Z34.81 PRENATAL CARE, SUBSEQUENT PREGNANCY IN FIRST TRIMESTER: ICD-10-CM

## 2025-06-02 PROBLEM — Z34.80 PRENATAL CARE, SUBSEQUENT PREGNANCY: Status: ACTIVE | Noted: 2025-06-02

## 2025-06-02 PROBLEM — O09.529 AMA (ADVANCED MATERNAL AGE) MULTIGRAVIDA 35+: Status: ACTIVE | Noted: 2025-06-02

## 2025-06-02 PROCEDURE — 3078F DIAST BP <80 MM HG: CPT | Performed by: NURSE PRACTITIONER

## 2025-06-02 PROCEDURE — 0500F INITIAL PRENATAL CARE VISIT: CPT | Performed by: NURSE PRACTITIONER

## 2025-06-02 PROCEDURE — 8198 PREG CTR PKG RATE (SYSTEM): Performed by: NURSE PRACTITIONER

## 2025-06-02 PROCEDURE — 3074F SYST BP LT 130 MM HG: CPT | Performed by: NURSE PRACTITIONER

## 2025-06-02 RX ORDER — PNV NO.95/FERROUS FUM/FOLIC AC 28MG-0.8MG
TABLET ORAL
COMMUNITY

## 2025-06-02 ASSESSMENT — ENCOUNTER SYMPTOMS
CARDIOVASCULAR NEGATIVE: 1
RESPIRATORY NEGATIVE: 1
EYES NEGATIVE: 1
GASTROINTESTINAL NEGATIVE: 1
CONSTITUTIONAL NEGATIVE: 1
PSYCHIATRIC NEGATIVE: 1
MUSCULOSKELETAL NEGATIVE: 1
NEUROLOGICAL NEGATIVE: 1

## 2025-06-02 ASSESSMENT — EDINBURGH POSTNATAL DEPRESSION SCALE (EPDS)
I HAVE BLAMED MYSELF UNNECESSARILY WHEN THINGS WENT WRONG: NO, NEVER
I HAVE BEEN ABLE TO LAUGH AND SEE THE FUNNY SIDE OF THINGS: AS MUCH AS I ALWAYS COULD
THE THOUGHT OF HARMING MYSELF HAS OCCURRED TO ME: NEVER
TOTAL SCORE: 7
I HAVE BEEN SO UNHAPPY THAT I HAVE BEEN CRYING: ONLY OCCASIONALLY
I HAVE LOOKED FORWARD WITH ENJOYMENT TO THINGS: AS MUCH AS I EVER DID
I HAVE FELT SAD OR MISERABLE: NOT VERY OFTEN
I HAVE BEEN ANXIOUS OR WORRIED FOR NO GOOD REASON: YES, SOMETIMES
THINGS HAVE BEEN GETTING ON TOP OF ME: NO, MOST OF THE TIME I HAVE COPED QUITE WELL
I HAVE BEEN SO UNHAPPY THAT I HAVE HAD DIFFICULTY SLEEPING: NOT AT ALL
I HAVE FELT SCARED OR PANICKY FOR NO GOOD REASON: YES, SOMETIMES

## 2025-06-02 NOTE — PROGRESS NOTES
Subjective     S:  Paty Parrish is a 40 y.o.  female  @ She is 8w2d with an MARGARETH of Estimated Date of Delivery: 1/10/26 based off of LMP who presents for her new OB exam.        One ER visits for n/v in this pregnancy. She reports n/v, o/w no complaints. Reports no FM, VB, LOF, or cramping.  Denies dysuria, vaginal DC.  Pt is single and lives with family. FOB is Carlos who is present and supportive. She is currently working as , discussed heavy lifting, no chemical exposure.  Pregnancy is planned.  Wants to consider genetic screening, will ask at next visit.    OB/GYN Hx:    x3, IAB x 1  History of fibroids, cervical, uterine or ovarian surgery: no  Last pap smear was unsure  History of HSV I or II in self or partner: no  History of Thyroid problems: no  History of STIs in self or partner: no  History of tobacco, drug or alcohol use: no  History of depression or anxiety no    O:    Vitals:    25 1045   BP: 110/60   Weight: 157 lb 6.4 oz    See H&P Prenatal Physical and Prenatal Vitals.              A:   1.  IUP @ 8w2d MARGARETH: Estimated Date of Delivery: 1/10/26 per LMP         2.  S=D        3.    Patient Active Problem List    Diagnosis Date Noted    Prenatal care, subsequent pregnancy 2025    AMA (advanced maternal age) multigravida 35+ 2025    Encounter for cosmetic surgery 2020         P:  1.  GC/CT self collect. Pap declined, wants to wait for PP visit.         2.  Prenatal labs ordered - lab slip given        3.  Discussed diet and exercise during pregnancy. Encouraged good nutrition, and daily exercise including walking or swimming. Discussed expected weight gain during pregnancy.              4.  Discussed adequate hydration during pregnancy.        5.  NOB packet given        6.  Return to office in 4 wks        7.  Complete OB US in 12 wks        8.  Pregnancy guide provided        9.  Childbirth education discussed. Not a candidate for  Centering Pregnancy. Referral sent      10.  Add'l lab: early 1 hr GTT      HPI    Review of Systems   Constitutional: Negative.    HENT: Negative.     Eyes: Negative.    Respiratory: Negative.     Cardiovascular: Negative.    Gastrointestinal: Negative.    Genitourinary: Negative.    Musculoskeletal: Negative.    Skin: Negative.    Neurological: Negative.    Endo/Heme/Allergies: Negative.    Psychiatric/Behavioral: Negative.                Objective     /60   Wt 157 lb 6.4 oz   LMP 04/05/2025 (Exact Date)   BMI 26.60 kg/m²      Physical Exam  Vitals and nursing note reviewed.   Constitutional:       Appearance: She is well-developed.   Cardiovascular:      Rate and Rhythm: Normal rate and regular rhythm.      Heart sounds: Normal heart sounds.   Pulmonary:      Effort: Pulmonary effort is normal.      Breath sounds: Normal breath sounds.   Abdominal:      Palpations: Abdomen is soft.   Genitourinary:     Vagina: Normal.      Comments: Uterus enlarged, c/w 8 wk ga  Musculoskeletal:         General: Normal range of motion.      Cervical back: Normal range of motion and neck supple.   Skin:     General: Skin is warm and dry.   Neurological:      Mental Status: She is alert and oriented to person, place, and time.      Deep Tendon Reflexes: Reflexes are normal and symmetric.   Psychiatric:         Behavior: Behavior normal.         Thought Content: Thought content normal.         Judgment: Judgment normal.      Comments: EPDS = 7                                  Assessment & Plan  Multigravida of advanced maternal age in first trimester         Prenatal care, subsequent pregnancy in first trimester    Orders:    Chlamydia/GC, PCR (Genital/Anal swab); Future    PREG CNTR PRENATAL PN; Future    URINE CULTURE(NEW); Future    URINE DRUG SCREEN W/CONF (AR); Future    US-OB 2ND 3RD TRI COMPLETE; Future    GLUCOSE 1HR GESTATIONAL; Future

## 2025-06-02 NOTE — PROGRESS NOTES
NOB today  LMP: 4/5/25 exact  Last pap: pt is unsure pt would like to wait   Phone # [phone] (home)   Pharmacy confirmed  On PNV  Genetic screening pt would like to think about it   Vaginal symptoms: none   EPDS= 7       Libtayo Counseling- I discussed with the patient the risks of Libtayo including but not limited to nausea, vomiting, diarrhea, and bone or muscle pain.  The patient verbalized understanding of the proper use and possible adverse effects of Libtayo.  All of the patient's questions and concerns were addressed.

## 2025-06-02 NOTE — ASSESSMENT & PLAN NOTE
Orders:    Chlamydia/GC, PCR (Genital/Anal swab); Future    PREG CNTR PRENATAL PN; Future    URINE CULTURE(NEW); Future    URINE DRUG SCREEN W/CONF (AR); Future    US-OB 2ND 3RD TRI COMPLETE; Future    GLUCOSE 1HR GESTATIONAL; Future

## 2025-06-03 LAB
C TRACH DNA GENITAL QL NAA+PROBE: NEGATIVE
N GONORRHOEA DNA GENITAL QL NAA+PROBE: NEGATIVE
SPECIMEN SOURCE: NORMAL

## 2025-06-16 ENCOUNTER — APPOINTMENT (OUTPATIENT)
Dept: OBGYN | Facility: CLINIC | Age: 40
End: 2025-06-16

## 2025-07-08 ENCOUNTER — ROUTINE PRENATAL (OUTPATIENT)
Dept: OBGYN | Facility: CLINIC | Age: 40
End: 2025-07-08

## 2025-07-08 VITALS — DIASTOLIC BLOOD PRESSURE: 50 MMHG | WEIGHT: 160 LBS | SYSTOLIC BLOOD PRESSURE: 90 MMHG | BODY MASS INDEX: 27.04 KG/M2

## 2025-07-08 DIAGNOSIS — Z34.82 PRENATAL CARE, SUBSEQUENT PREGNANCY IN SECOND TRIMESTER: Primary | ICD-10-CM

## 2025-07-08 DIAGNOSIS — O09.512 SUPERVISION OF ELDERLY PRIMIGRAVIDA, SECOND TRIMESTER: ICD-10-CM

## 2025-07-08 PROCEDURE — 3078F DIAST BP <80 MM HG: CPT

## 2025-07-08 PROCEDURE — 3074F SYST BP LT 130 MM HG: CPT

## 2025-07-08 PROCEDURE — 0502F SUBSEQUENT PRENATAL CARE: CPT

## 2025-07-08 NOTE — PROGRESS NOTES
S: Pt is a 40 y.o.  at 13w3d gestation here today for routine prenatal care. Reports some nausea and vomiting. Has not gotten labs done though planning to get them done this week.      O: BP 90/50   Wt 160 lb    *see prenatal flowsheet*     A: IUP at 13w3d       S=D         Patient Active Problem List    Diagnosis Date Noted    Prenatal care, subsequent pregnancy 2025    AMA (advanced maternal age) multigravida 35+ 2025    Encounter for cosmetic surgery 2020       P:   -Discussed normal s/sx pregnancy appropriate for gestational age general discomforts vs warning signs that necessitate evaluation in OB triage. Reviewed fetal movements appropriate for EGA. Reinforced adequate hydration and nutrition.  -Disc normal weight gain in pregnancy  -Has anatomy scan scheduled  -B6 and unisom regimen discussed for nausea   -NIPT ordered, information given for [facility]  -msAFP ordered today, will complete next week with routine prenatal labs  -RTC in 4 weeks for routine prenatal care      Marnie Avendaño C.N.M.

## 2025-07-08 NOTE — PROGRESS NOTES
Pt here today for OBFV   +FM movemnet  No VB, LOF. Uc's   Phone number [phone] (home)   Pharmacy verified   US 9/3  Labs- pt states she will get them done sometime this week

## 2025-08-05 ENCOUNTER — HOSPITAL ENCOUNTER (OUTPATIENT)
Dept: LAB | Facility: MEDICAL CENTER | Age: 40
End: 2025-08-05
Payer: COMMERCIAL

## 2025-08-05 ENCOUNTER — HOSPITAL ENCOUNTER (OUTPATIENT)
Dept: LAB | Facility: MEDICAL CENTER | Age: 40
End: 2025-08-05
Attending: NURSE PRACTITIONER
Payer: COMMERCIAL

## 2025-08-05 DIAGNOSIS — Z34.82 PRENATAL CARE, SUBSEQUENT PREGNANCY IN SECOND TRIMESTER: ICD-10-CM

## 2025-08-05 DIAGNOSIS — Z34.81 PRENATAL CARE, SUBSEQUENT PREGNANCY IN FIRST TRIMESTER: ICD-10-CM

## 2025-08-05 LAB
ABO GROUP BLD: NORMAL
BLD GP AB SCN SERPL QL: NORMAL
ERYTHROCYTE [DISTWIDTH] IN BLOOD BY AUTOMATED COUNT: 41.3 FL (ref 35.9–50)
HBV SURFACE AG SER QL: ABNORMAL
HCT VFR BLD AUTO: 38.8 % (ref 37–47)
HCV AB SER QL: ABNORMAL
HGB BLD-MCNC: 13.4 G/DL (ref 12–16)
HIV 1+2 AB+HIV1 P24 AG SERPL QL IA: NORMAL
MCH RBC QN AUTO: 31.4 PG (ref 27–33)
MCHC RBC AUTO-ENTMCNC: 34.5 G/DL (ref 32.2–35.5)
MCV RBC AUTO: 90.9 FL (ref 81.4–97.8)
PLATELET # BLD AUTO: 205 K/UL (ref 164–446)
PMV BLD AUTO: 12.6 FL (ref 9–12.9)
RBC # BLD AUTO: 4.27 M/UL (ref 4.2–5.4)
RH BLD: NORMAL
RUBV AB SER QL: 203 IU/ML
T PALLIDUM AB SER QL IA: REACTIVE
WBC # BLD AUTO: 9.1 K/UL (ref 4.8–10.8)

## 2025-08-06 LAB — RPR SER QL: NON REACTIVE

## 2025-08-07 ENCOUNTER — RESULTS FOLLOW-UP (OUTPATIENT)
Dept: OBGYN | Facility: CLINIC | Age: 40
End: 2025-08-07

## 2025-08-07 LAB
AMPHET CTO UR CFM-MCNC: NEGATIVE NG/ML
BACTERIA UR CULT: ABNORMAL
BACTERIA UR CULT: ABNORMAL
BARBITURATES CTO UR CFM-MCNC: NEGATIVE NG/ML
BENZODIAZ CTO UR CFM-MCNC: NEGATIVE NG/ML
CANNABINOIDS CTO UR CFM-MCNC: NEGATIVE NG/ML
COCAINE CTO UR CFM-MCNC: NEGATIVE NG/ML
CREAT UR-MCNC: 33.2 MG/DL (ref 20–400)
DRUG COMMENT 753798: NORMAL
METHADONE CTO UR CFM-MCNC: NEGATIVE NG/ML
OPIATES CTO UR CFM-MCNC: NEGATIVE NG/ML
PCP CTO UR CFM-MCNC: NEGATIVE NG/ML
PROPOXYPH CTO UR CFM-MCNC: NEGATIVE NG/ML
SIGNIFICANT IND 70042: ABNORMAL
SITE SITE: ABNORMAL
SOURCE SOURCE: ABNORMAL

## 2025-08-08 ENCOUNTER — ROUTINE PRENATAL (OUTPATIENT)
Dept: OBGYN | Facility: CLINIC | Age: 40
End: 2025-08-08

## 2025-08-08 VITALS — WEIGHT: 159 LBS | BODY MASS INDEX: 26.87 KG/M2 | DIASTOLIC BLOOD PRESSURE: 50 MMHG | SYSTOLIC BLOOD PRESSURE: 90 MMHG

## 2025-08-08 DIAGNOSIS — O09.522 MULTIGRAVIDA OF ADVANCED MATERNAL AGE IN SECOND TRIMESTER: Primary | ICD-10-CM

## 2025-08-08 LAB
# FETUSES US: NORMAL
AFP MOM SERPL: 0.9
AFP SERPL-MCNC: 36 NG/ML
AGE - REPORTED: 38.6 YR
CURRENT SMOKER: NO
FAMILY MEMBER DISEASES HX: NO
GA METHOD: NORMAL
GA: NORMAL WK
IDDM PATIENT QL: NO
INTEGRATED SCN PATIENT-IMP: NORMAL
SPECIMEN DRAWN SERPL: NORMAL

## 2025-08-08 PROCEDURE — 0502F SUBSEQUENT PRENATAL CARE: CPT | Performed by: NURSE PRACTITIONER

## 2025-08-08 PROCEDURE — 3078F DIAST BP <80 MM HG: CPT | Performed by: NURSE PRACTITIONER

## 2025-08-08 PROCEDURE — 3074F SYST BP LT 130 MM HG: CPT | Performed by: NURSE PRACTITIONER

## 2025-08-09 LAB — T PALLIDUM AB SER QL AGGL: REACTIVE

## 2025-08-12 PROBLEM — O98.112 SYPHILIS AFFECTING PREGNANCY IN SECOND TRIMESTER: Status: ACTIVE | Noted: 2025-08-12

## 2025-08-14 DIAGNOSIS — O09.512 SUPERVISION OF ELDERLY PRIMIGRAVIDA, SECOND TRIMESTER: ICD-10-CM

## (undated) DEVICE — Device

## (undated) DEVICE — HUMID-VENT HEAT AND MOISTURE EXCHANGE- (50/BX)

## (undated) DEVICE — KIT ANESTHESIA W/CIRCUIT & 3/LT BAG W/FILTER (20EA/CA)

## (undated) DEVICE — MASK ANESTHESIA ADULT  - (100/CA)

## (undated) DEVICE — CLOSURE ELASTIC SKIN 1 X 5 IN STERI STRIP (100/CA)

## (undated) DEVICE — MASK AIRWAY SIZE 3 UNIQUE SILICON (10/BX)

## (undated) DEVICE — BAG SPONGE COUNT 10.25 X 32 - BLUE (250/CA)

## (undated) DEVICE — CHLORAPREP 26 ML APPLICATOR - ORANGE TINT(25/CA)

## (undated) DEVICE — SUCTION INSTRUMENT YANKAUER BULBOUS TIP W/O VENT (50EA/CA)

## (undated) DEVICE — PACK BREAST SM OR - (1EA/CA)

## (undated) DEVICE — GOWN SURGICAL XX-LARGE - (28EA/CA) SIRUS NON REINFORCED

## (undated) DEVICE — LACTATED RINGERS INJ 1000 ML - (14EA/CA 60CA/PF)

## (undated) DEVICE — ELECTRODE 850 FOAM ADHESIVE - HYDROGEL RADIOTRNSPRNT (50/PK)

## (undated) DEVICE — SOD. CHL. INJ. 0.9% 1000 ML - (14EA/CA 60CA/PF)

## (undated) DEVICE — SUTURE 4-0 30CM X 30CM STRATAFIX SPRIAL PGA/PCL CLR FS-2 NEEDLE (12/BX)

## (undated) DEVICE — HEAD HOLDER JUNIOR/ADULT

## (undated) DEVICE — GOWN WARMING STANDARD FLEX - (30/CA)

## (undated) DEVICE — SENSOR SPO2 NEO LNCS ADHESIVE (20/BX) SEE USER NOTES

## (undated) DEVICE — BOVIE BLADE COATED &INSULATED (50EA/PK)

## (undated) DEVICE — BLANKET UNDERBODY FULL ACCES - (5/CA)

## (undated) DEVICE — SODIUM CHL IRRIGATION 0.9% 1000ML (12EA/CA)

## (undated) DEVICE — WATER IRRIGATION STERILE 1000ML (12EA/CA)

## (undated) DEVICE — GLOVE, LITE (PAIR)

## (undated) DEVICE — PROTECTOR ULNA NERVE - (36PR/CA)

## (undated) DEVICE — NEPTUNE 4 PORT MANIFOLD - (20/PK)

## (undated) DEVICE — SUTURE 3-0 VICRYL PLUSPS-2 - 18 INCH (36/BX)

## (undated) DEVICE — ELECTRODE DUAL RETURN W/ CORD - (50/PK)

## (undated) DEVICE — SUTURE GENERAL

## (undated) DEVICE — TUBE CONNECTING SUCTION - CLEAR PLASTIC STERILE 72 IN (50EA/CA)

## (undated) DEVICE — GLOVE BIOGEL SZ 8.5 SURGICAL PF LTX - (50PR/BX 4BX/CA)

## (undated) DEVICE — SPONGE GAUZESTER 4 X 4 4PLY - (128PK/CA)

## (undated) DEVICE — CANISTER SUCTION RIGID RED 1500CC (40EA/CA)

## (undated) DEVICE — ADHESIVE MASTISOL - (48/BX)